# Patient Record
Sex: MALE | Race: WHITE | NOT HISPANIC OR LATINO | URBAN - METROPOLITAN AREA
[De-identification: names, ages, dates, MRNs, and addresses within clinical notes are randomized per-mention and may not be internally consistent; named-entity substitution may affect disease eponyms.]

---

## 2018-12-06 ENCOUNTER — OFFICE VISIT (OUTPATIENT)
Dept: URBAN - METROPOLITAN AREA CLINIC 71 | Facility: CLINIC | Age: 73
End: 2018-12-06
Payer: MEDICARE

## 2018-12-06 DIAGNOSIS — H35.371 PUCKERING OF MACULA, RIGHT EYE: Primary | ICD-10-CM

## 2018-12-06 PROCEDURE — 92134 CPTRZ OPH DX IMG PST SGM RTA: CPT | Performed by: OPTOMETRIST

## 2018-12-06 PROCEDURE — 92014 COMPRE OPH EXAM EST PT 1/>: CPT | Performed by: OPTOMETRIST

## 2018-12-06 ASSESSMENT — INTRAOCULAR PRESSURE
OD: 15
OS: 16

## 2018-12-06 NOTE — IMPRESSION/PLAN
Impression: Diagnosis: Puckering of macula, right eye. Code: Q13.344. Condition: established, stable. Vision stable. Metamorphopsia stable OU. VA stable OU. Plan: Monitor. Pt ed re: symptoms of RD, macular hole. Continue with Amsler grid. Call if worsens. Pt prefers no surgery but understands could be worsening/prognosis worsens. Pt to schedule consult with Dr. Sameer Mcintyre as desired.

## 2018-12-06 NOTE — IMPRESSION/PLAN
Impression: Diagnosis: Macular cyst, hole, or pseudohole, left eye. Code: N50.550. s/p repair. Wrinkles persist (see OCT). Plan: Pt happy with stable vision. continue to monitor. Call if worsens.

## 2019-12-09 ENCOUNTER — OFFICE VISIT (OUTPATIENT)
Dept: URBAN - METROPOLITAN AREA CLINIC 71 | Facility: CLINIC | Age: 74
End: 2019-12-09
Payer: MEDICARE

## 2019-12-09 PROCEDURE — 92014 COMPRE OPH EXAM EST PT 1/>: CPT | Performed by: OPTOMETRIST

## 2019-12-09 PROCEDURE — 92134 CPTRZ OPH DX IMG PST SGM RTA: CPT | Performed by: OPTOMETRIST

## 2019-12-09 ASSESSMENT — INTRAOCULAR PRESSURE
OS: 15
OD: 14

## 2019-12-09 NOTE — IMPRESSION/PLAN
Impression: Puckering of macula, bilateral: H35.373. OU.
12/9/19- OCT confirms ERM OU, stable Plan: Continue observation. Pt does not want to consider surgery unless absolutely necessary.

## 2019-12-09 NOTE — IMPRESSION/PLAN
Impression: Diagnosis: Macular cyst, hole, or pseudohole, left eye. Code: C35.438. s/p repair. Wrinkles persist (see OCT). Plan: Pt happy with stable vision. continue to monitor. Call if worsens.

## 2021-03-05 ENCOUNTER — OFFICE VISIT (OUTPATIENT)
Dept: URBAN - METROPOLITAN AREA CLINIC 71 | Facility: CLINIC | Age: 76
End: 2021-03-05
Payer: MEDICARE

## 2021-03-05 PROCEDURE — 92014 COMPRE OPH EXAM EST PT 1/>: CPT | Performed by: OPTOMETRIST

## 2021-03-05 PROCEDURE — 92134 CPTRZ OPH DX IMG PST SGM RTA: CPT | Performed by: OPTOMETRIST

## 2021-03-05 ASSESSMENT — INTRAOCULAR PRESSURE
OD: 13
OS: 14

## 2021-03-05 NOTE — IMPRESSION/PLAN
Impression: Diagnosis: Macular cyst, hole, or pseudohole, left eye. Code: G79.581. s/p repair. Wrinkles persist (see OCT). Plan: Pt happy with stable vision. Continue to monitor. Call if worsens.

## 2021-03-05 NOTE — IMPRESSION/PLAN
Impression: Puckering of macula, bilateral: H35.373. OU. OCT 03/05/21: stable OU Plan: Continue observation. Pt does not want to consider surgery unless absolutely necessary.

## 2022-03-03 ENCOUNTER — OFFICE VISIT (OUTPATIENT)
Dept: URBAN - METROPOLITAN AREA CLINIC 71 | Facility: CLINIC | Age: 77
End: 2022-03-03
Payer: MEDICARE

## 2022-03-03 DIAGNOSIS — H35.373 PUCKERING OF MACULA, BILATERAL: Primary | ICD-10-CM

## 2022-03-03 DIAGNOSIS — H43.811 VITREOUS DEGENERATION, RIGHT EYE: ICD-10-CM

## 2022-03-03 DIAGNOSIS — H35.342 MACULAR CYST, HOLE, OR PSEUDOHOLE, LEFT EYE: ICD-10-CM

## 2022-03-03 DIAGNOSIS — Z96.1 PRESENCE OF INTRAOCULAR LENS: ICD-10-CM

## 2022-03-03 PROCEDURE — 92134 CPTRZ OPH DX IMG PST SGM RTA: CPT | Performed by: OPTOMETRIST

## 2022-03-03 PROCEDURE — 92014 COMPRE OPH EXAM EST PT 1/>: CPT | Performed by: OPTOMETRIST

## 2022-03-03 ASSESSMENT — INTRAOCULAR PRESSURE
OS: 16
OD: 14

## 2022-03-03 NOTE — IMPRESSION/PLAN
Impression: Puckering of macula, bilateral: H35.373. OU. OCT 03/03/22: ERM OU s/p ERMx OS, stable OU Plan: Continue observation. Pt does not want to consider surgery OD unless absolutely necessary. Amsler grid again given and explained to pt in order to closely monitor for any changes in vision. ERM/vision stable OD, no surgery recommended at this time. Pt to call with any changes or if he would like to discuss surgery.

## 2022-03-03 NOTE — IMPRESSION/PLAN
Impression: Diagnosis: Macular cyst, hole, or pseudohole, left eye. Code: L93.333. s/p repair. Wrinkles persist (see OCT). Pt still happy with vision. Plan: Continue to monitor. Call if worsens.

## 2023-03-08 ENCOUNTER — OFFICE VISIT (OUTPATIENT)
Dept: URBAN - METROPOLITAN AREA CLINIC 71 | Facility: CLINIC | Age: 78
End: 2023-03-08
Payer: MEDICARE

## 2023-03-08 DIAGNOSIS — H43.811 VITREOUS DEGENERATION, RIGHT EYE: ICD-10-CM

## 2023-03-08 DIAGNOSIS — Z96.1 PRESENCE OF INTRAOCULAR LENS: ICD-10-CM

## 2023-03-08 DIAGNOSIS — H35.373 PUCKERING OF MACULA, BILATERAL: Primary | ICD-10-CM

## 2023-03-08 PROCEDURE — 92014 COMPRE OPH EXAM EST PT 1/>: CPT | Performed by: OPTOMETRIST

## 2023-03-08 PROCEDURE — 92134 CPTRZ OPH DX IMG PST SGM RTA: CPT | Performed by: OPTOMETRIST

## 2023-03-08 ASSESSMENT — INTRAOCULAR PRESSURE
OS: 18
OD: 16

## 2023-03-08 ASSESSMENT — KERATOMETRY
OS: 44.88
OD: 44.75

## 2023-03-08 NOTE — IMPRESSION/PLAN
Impression: Diagnosis: Presence of intraocular lens. Code: Z96.1. OU. s/p YAG OU. Plan: Continue to monitor.

## 2023-03-08 NOTE — IMPRESSION/PLAN
Impression: Puckering of macula, bilateral: H35.373. OU. OCT 03/08/23: ERM stable OD, s/p vitrectomy OS in 2009. Plan: Discussed. Pt does not want to consider surgery OD unless absolutely necessary. No surgery recommended at this time. Pt to call with any changes or if he would like to discuss surgery. Continue to observe.

## 2024-03-11 ENCOUNTER — OFFICE VISIT (OUTPATIENT)
Dept: URBAN - METROPOLITAN AREA CLINIC 71 | Facility: CLINIC | Age: 79
End: 2024-03-11
Payer: MEDICARE

## 2024-03-11 DIAGNOSIS — Z96.1 PRESENCE OF INTRAOCULAR LENS: ICD-10-CM

## 2024-03-11 DIAGNOSIS — H43.811 VITREOUS DEGENERATION, RIGHT EYE: ICD-10-CM

## 2024-03-11 DIAGNOSIS — H35.373 PUCKERING OF MACULA, BILATERAL: Primary | ICD-10-CM

## 2024-03-11 DIAGNOSIS — H52.4 PRESBYOPIA: ICD-10-CM

## 2024-03-11 PROCEDURE — 99214 OFFICE O/P EST MOD 30 MIN: CPT | Performed by: OPTOMETRIST

## 2024-03-11 PROCEDURE — 92134 CPTRZ OPH DX IMG PST SGM RTA: CPT | Performed by: OPTOMETRIST

## 2024-03-11 ASSESSMENT — INTRAOCULAR PRESSURE
OS: 18
OD: 15

## 2024-03-11 ASSESSMENT — VISUAL ACUITY: OS: 20/25
